# Patient Record
Sex: FEMALE | Race: WHITE | NOT HISPANIC OR LATINO | Employment: UNEMPLOYED | URBAN - METROPOLITAN AREA
[De-identification: names, ages, dates, MRNs, and addresses within clinical notes are randomized per-mention and may not be internally consistent; named-entity substitution may affect disease eponyms.]

---

## 2017-01-04 ENCOUNTER — ANESTHESIA (OUTPATIENT)
Dept: PERIOP | Facility: HOSPITAL | Age: 52
End: 2017-01-04

## 2017-01-04 ENCOUNTER — ANESTHESIA EVENT (OUTPATIENT)
Dept: PERIOP | Facility: HOSPITAL | Age: 52
End: 2017-01-04

## 2017-01-04 PROCEDURE — 25010000002 PROPOFOL 10 MG/ML EMULSION: Performed by: NURSE ANESTHETIST, CERTIFIED REGISTERED

## 2017-01-04 PROCEDURE — 25010000002 ONDANSETRON PER 1 MG: Performed by: NURSE ANESTHETIST, CERTIFIED REGISTERED

## 2017-01-04 PROCEDURE — 25010000002 FENTANYL CITRATE (PF) 100 MCG/2ML SOLUTION: Performed by: NURSE ANESTHETIST, CERTIFIED REGISTERED

## 2017-01-04 PROCEDURE — 25010000003 CEFAZOLIN IN DEXTROSE 2-4 GM/100ML-% SOLUTION: Performed by: SURGERY

## 2017-01-04 PROCEDURE — 25010000002 MIDAZOLAM PER 1 MG: Performed by: NURSE ANESTHETIST, CERTIFIED REGISTERED

## 2017-01-04 RX ORDER — PROPOFOL 10 MG/ML
VIAL (ML) INTRAVENOUS CONTINUOUS PRN
Status: DISCONTINUED | OUTPATIENT
Start: 2017-01-04 | End: 2017-01-04 | Stop reason: SURG

## 2017-01-04 RX ORDER — ONDANSETRON 2 MG/ML
INJECTION INTRAMUSCULAR; INTRAVENOUS AS NEEDED
Status: DISCONTINUED | OUTPATIENT
Start: 2017-01-04 | End: 2017-01-04 | Stop reason: SURG

## 2017-01-04 RX ORDER — MIDAZOLAM HYDROCHLORIDE 1 MG/ML
INJECTION INTRAMUSCULAR; INTRAVENOUS AS NEEDED
Status: DISCONTINUED | OUTPATIENT
Start: 2017-01-04 | End: 2017-01-04 | Stop reason: SURG

## 2017-01-04 RX ORDER — LIDOCAINE HYDROCHLORIDE 10 MG/ML
INJECTION, SOLUTION INFILTRATION; PERINEURAL AS NEEDED
Status: DISCONTINUED | OUTPATIENT
Start: 2017-01-04 | End: 2017-01-04 | Stop reason: SURG

## 2017-01-04 RX ORDER — FENTANYL CITRATE 50 UG/ML
INJECTION, SOLUTION INTRAMUSCULAR; INTRAVENOUS AS NEEDED
Status: DISCONTINUED | OUTPATIENT
Start: 2017-01-04 | End: 2017-01-04 | Stop reason: SURG

## 2017-01-04 RX ADMIN — MIDAZOLAM HYDROCHLORIDE 1 MG: 1 INJECTION, SOLUTION INTRAMUSCULAR; INTRAVENOUS at 10:32

## 2017-01-04 RX ADMIN — ONDANSETRON 4 MG: 2 INJECTION INTRAMUSCULAR; INTRAVENOUS at 10:36

## 2017-01-04 RX ADMIN — FENTANYL CITRATE 50 MCG: 50 INJECTION INTRAMUSCULAR; INTRAVENOUS at 10:25

## 2017-01-04 RX ADMIN — LIDOCAINE HYDROCHLORIDE 60 MG: 10 INJECTION, SOLUTION INFILTRATION; PERINEURAL at 10:25

## 2017-01-04 RX ADMIN — PROPOFOL 100 MCG/KG/MIN: 10 INJECTION, EMULSION INTRAVENOUS at 10:25

## 2017-01-04 RX ADMIN — CEFAZOLIN SODIUM 2 G: 2 INJECTION, SOLUTION INTRAVENOUS at 10:28

## 2017-01-04 RX ADMIN — MIDAZOLAM HYDROCHLORIDE 1 MG: 1 INJECTION, SOLUTION INTRAMUSCULAR; INTRAVENOUS at 10:25

## 2017-01-04 NOTE — ANESTHESIA PREPROCEDURE EVALUATION
Anesthesia Evaluation     Patient summary reviewed    Airway   Mallampati: III  TM distance: >3 FB  possible difficult intubation  Dental      Pulmonary - normal exam   (+) sleep apnea on CPAP,   Cardiovascular - normal exam  (-) dysrhythmias    ECG reviewed    Neuro/Psych  GI/Hepatic/Renal/Endo    (+)  diabetes mellitus,     Musculoskeletal     Abdominal    Substance History      OB/GYN          Other                             Anesthesia Plan    ASA 3     MAC     intravenous induction   Anesthetic plan and risks discussed with patient.

## 2017-01-04 NOTE — ANESTHESIA POSTPROCEDURE EVALUATION
Patient: Shelby Jorgensen    Procedure Summary     Date Anesthesia Start Anesthesia Stop Room / Location    01/04/17 1019 1056  [facility] / [facility]       Procedure Diagnosis Surgeon Provider    EXCISION LEFT UPPER ABDOMINAL WALL LIPOMA (Left ) Lipoma of torso  (Lipoma of torso [D17.1]) MD Rosalie Wolfe MD          Anesthesia Type: MAC  Last vitals  BP      Temp      Pulse     Resp      SpO2 100 % (POST SEDATION) (01/04/17 1017)      Post Anesthesia Care and Evaluation    Patient location during evaluation: bedside  Patient participation: complete - patient participated  Level of consciousness: awake  Pain score: 2  Pain management: adequate  Airway patency: patent  Anesthetic complications: no  Cardiovascular status: acceptable  Respiratory status: acceptable  Hydration status: acceptable

## 2017-02-07 ENCOUNTER — OFFICE VISIT (OUTPATIENT)
Dept: SURGERY | Facility: CLINIC | Age: 52
End: 2017-02-07

## 2017-02-07 DIAGNOSIS — D17.1 LIPOMA OF TORSO: Primary | ICD-10-CM

## 2017-02-07 PROCEDURE — 99024 POSTOP FOLLOW-UP VISIT: CPT | Performed by: SURGERY

## 2017-02-07 NOTE — PROGRESS NOTES
CHIEF COMPLAINT:   Chief Complaint   Patient presents with   • Post-op     PO ·Excision of left upper abdominal wall lipoma (9 cm x 5.5 cm x 2 cm) 1/4/17       HISTORY OF PRESENT ILLNESS:  This is a 51 y.o. female  who presents for a post-operative visit after undergoing a lipoma excision from the left upper abdominal wall on 01/04/2017. She has been doing well and denies any erythema or drainage from the incision. Interestingly, she underwent a laparoscopic gynecologic procedure the day after the lipoma removal and then subsequently caught the flu. So she has struggled to recover well but is not having any issues with respect to the lipoma excision.    Pathology:   LEFT UPPER ABDOMINAL WALL LIPOMA:  LIPOMA WITH FAT NECROSIS.    PHYSICAL EXAM:  Lungs: Clear  Heart: RRR  ABD: Incision is healing well without any erythema or signs of infection.  Ext: no significant edema, calves nontender    A/P:  This is a 51 y.o. female patient who is S/P left upper abdominal wall lipoma excision    Her incision is healing beautifully. Pathology benign. She can follow-up with me as needed.    Kristy Do MD  General and Endoscopic Surgery  [facility], [place], operated by [facility]    [address], [address]  [place], [place], [address]  P: [phone]  F: [phone]

## 2020-12-11 ENCOUNTER — TRANSCRIBE ORDERS (OUTPATIENT)
Dept: ADMINISTRATIVE | Facility: HOSPITAL | Age: 55
End: 2020-12-11

## 2020-12-11 DIAGNOSIS — C73 THYROID CANCER (HCC): Primary | ICD-10-CM

## 2020-12-17 ENCOUNTER — HOSPITAL ENCOUNTER (OUTPATIENT)
Dept: PET IMAGING | Facility: HOSPITAL | Age: 55
Discharge: HOME | End: 2020-12-17

## 2020-12-17 DIAGNOSIS — C73 THYROID CANCER (HCC): ICD-10-CM

## 2020-12-17 LAB — GLUCOSE BLDC GLUCOMTR-MCNC: 110 MG/DL (ref 70–130)

## 2020-12-17 PROCEDURE — 78815 PET IMAGE W/CT SKULL-THIGH: CPT

## 2020-12-17 PROCEDURE — A9552 F18 FDG: HCPCS | Performed by: OTOLARYNGOLOGY

## 2020-12-17 PROCEDURE — 82962 GLUCOSE BLOOD TEST: CPT

## 2020-12-17 PROCEDURE — 0 FLUDEOXYGLUCOSE F18 SOLUTION: Performed by: OTOLARYNGOLOGY

## 2020-12-17 RX ADMIN — FLUDEOXYGLUCOSE F18 1 DOSE: 300 INJECTION INTRAVENOUS at 12:15

## 2020-12-18 ENCOUNTER — APPOINTMENT (OUTPATIENT)
Dept: OTHER | Facility: HOSPITAL | Age: 55
End: 2020-12-18

## 2020-12-18 DIAGNOSIS — Z09 FOLLOW UP: ICD-10-CM

## 2020-12-30 ENCOUNTER — TELEPHONE (OUTPATIENT)
Dept: ONCOLOGY | Facility: CLINIC | Age: 55
End: 2020-12-30

## 2020-12-30 ENCOUNTER — LAB REQUISITION (OUTPATIENT)
Dept: LAB | Facility: HOSPITAL | Age: 55
End: 2020-12-30

## 2020-12-30 DIAGNOSIS — Z00.00 ENCOUNTER FOR GENERAL ADULT MEDICAL EXAMINATION WITHOUT ABNORMAL FINDINGS: ICD-10-CM

## 2020-12-30 NOTE — TELEPHONE ENCOUNTER
Lm for patient to call Yony [phone], I want to verify patient wants to cancel appt 12/31/20 and does she want to reschedule.

## 2020-12-31 ENCOUNTER — APPOINTMENT (OUTPATIENT)
Dept: OTHER | Facility: HOSPITAL | Age: 55
End: 2020-12-31

## 2021-03-26 ENCOUNTER — BULK ORDERING (OUTPATIENT)
Dept: CASE MANAGEMENT | Facility: OTHER | Age: 56
End: 2021-03-26

## 2021-03-26 DIAGNOSIS — Z23 IMMUNIZATION DUE: ICD-10-CM

## 2021-04-20 NOTE — PROGRESS NOTES
"Subjective   Chief Complaint   Patient presents with   • Diabetes   • Hypothyroidism       History of Present Illness   55 y.o. female presents as a new patient to establish care and discuss DM2, HTN, HLD and hypothyroidism.    Diagnosed with thyroid cancer in November 2020. Thyroidectomy January 12th 2021. Undergoing 30 RXT and will finished 4/30/21; tolerating with close follow up with Dr. Wilkerson at the [facility].  ENT with  Dr. Sidhu but scheduling with Dr. Canela. Dr. Pan is her ONC with upcoming scans on 05/06/2021 and will see her 5/12. \"Insulin caused this to happen to me.\"    A-fib medication indued--Invokana stopped with subsequent ablation with Dr. Burgos 5-10 years ago; never recurred.     Diabetes diagnosed 6-7 years ago while seeing Dr. Kehrer. Currently taking Metformin and Jardiance--she is actually unsure what she is taking for her diabetes.  Previously managed by by Demetra Donald at [facility] but would like referral to someone else. H/O neuropathy combo of fall and her diabetes per patient.  Eye exam with Dr. Alvarez.      Denies CP, dyspnea, JORGE, visual changes, hematochezia or melena. No FH CRC. Never had a CLS but had negative Cologard--unsure when.     Reactive airway disease--seasonal Albuterol prn in summer with humidity.     Cymbalta for chronic pain as well as Lyrica for neuropathy and Tramadol with neuro.     P/P and MMG with Dr. Dewey who prescribes her Estrace.     CPAP with Dr. COX--she is not sure who this is--but uses it faithfully.     Taking several dietary supplements at the urging of her daughter. 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    • Nortriptyline Palpitations and Shortness Of Breath   • Prednisone Anaphylaxis     STATES SHORT " OF AIR, PASSED OUT   • Bupivacaine Other (See Comments)     SWELLING AND BLISTERS   • Cortisone Other (See Comments)     Topical blisters.   • Ethyl Chloride      INJECTION, CAUSED LOCAL REDNESS, BURNING   • Latex Swelling     SWELLING (LOCAL REACTION)   • Morphine Hives and Itching   • Oxycodone-Acetaminophen Itching       Current Outpatient Medications on File Prior to Visit   Medication Sig Dispense Refill   • albuterol sulfate HFA (Ventolin HFA) 108 (90 Base) MCG/ACT inhaler Inhale 2 puffs Every 4 (Four) Hours As Needed for Wheezing.     • aspirin 81 MG EC tablet Take 81 mg by mouth Daily.     • baclofen (LIORESAL) 10 MG tablet Take 10 mg by mouth 3 (Three) Times a Day.     • BIOTIN PO Take 1 tablet by mouth Daily. ON HOLD FOR SX.     • Calcium Carb-Cholecalciferol (Calcium/Vitamin D) 500-200 MG-UNIT tablet per tablet Take 1 tablet by mouth 2 (Two) Times a Day.     • DULoxetine (CYMBALTA) 60 MG capsule Take 60 mg by mouth Daily.     • ELDERBERRY PO Take  by mouth.     • Empagliflozin 25 MG tablet Take 25 mg by mouth Daily.     • estradiol (ESTRACE) 2 MG tablet Take 2 mg by mouth Daily.     • fenofibrate 160 MG tablet Take 160 mg by mouth Daily.     • HYDROcodone-acetaminophen (NORCO) 5-325 MG per tablet Take 1 tablet by mouth Every 4 (Four) Hours As Needed. for pain     • losartan (COZAAR) 50 MG tablet Take 50 mg by mouth Daily.     • metFORMIN (GLUCOPHAGE) 500 MG tablet Take 500 mg by mouth 2 (Two) Times a Day With Meals.     • oxyCODONE (ROXICODONE) 5 MG/5ML solution Take 5 mg by mouth Every 4 (Four) Hours As Needed for Moderate Pain . Liquid- every 4-6 hours     • pravastatin (PRAVACHOL) 10 MG tablet Take 10 mg by mouth Daily.     • pregabalin (Lyrica) 200 MG capsule Take 200 mg by mouth 2 (Two) Times a Day.     • traMADol (ULTRAM) 50 MG tablet Take 50 mg by mouth Every 6 (Six) Hours As Needed for Moderate Pain .     • Cholecalciferol (VITAMIN D PO) Take 2,000 mg by mouth Daily.     • sitaGLIPtin-metFORMIN  (JANUMET)  MG per tablet TAKE 1 TABLET BY MOUTH 2 (TWO) TIMES DAILY WITH MEALS     • [DISCONTINUED] busPIRone (BUSPAR) 15 MG tablet Take 15 mg by mouth 2 (Two) Times a Day.     • [DISCONTINUED] Pregabalin (LYRICA PO) Take 200 mg by mouth 2 (Two) Times a Day.     • [DISCONTINUED] tiZANidine (ZANAFLEX) 2 MG tablet TAKE 1 TABLET BY MOUTH EVERY 8 (EIGHT) HOURS AS NEEDED (MUSCLE SPASM)       No current facility-administered medications on file prior to visit.       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   • Degenerative disc disease, lumbar    • Diabetes mellitus (CMS/HCC)     type 2   • Neuropathy    • Sleep apnea     uses CPAP       Family History   Problem Relation Age of Onset   • Diabetes Mother    • Hypertension Father        Social History     Socioeconomic History   • Marital status:      Spouse name: Richard   • Number of children: Not on file   • Years of education: college   • Highest education level: Not on file   Tobacco Use   • Smoking status: Never Smoker   Substance and Sexual Activity   • Alcohol use: Yes     Comment: Occasional   • Drug use: No   • Sexual activity: Defer       Past Surgical History:   Procedure Laterality Date   • CARDIAC ABLATION  2012    Dr. Burgos   • CHOLECYSTECTOMY  1996   • EXCISION MASS TRUNK Left 1/4/2017    Procedure: EXCISION LEFT UPPER ABDOMINAL WALL LIPOMA;  Surgeon: Kristy Do MD;  Location: [facility];  Service:    • HYSTERECTOMY  2004    Dr. Dewey   • STEROID INJECTION N/A 01/14/2016    Multiple Epidural Steroid Injections to the lumbosacral region, [facility]       The following portions of the patient's history were reviewed and updated as appropriate: problem list, allergies, current medications, past medical history, past family history, past social history and past surgical history.    Review of  "Systems    Immunization History   Administered Date(s) Administered   • COVID-19 (MODERNA) 02/08/2021, 03/08/2021   • Influenza, Unspecified 09/08/2020       Objective   Vitals:    04/23/21 1019   BP: 118/78   Pulse: 72   Resp: 12   Temp: 97.1 °F (36.2 °C)   Weight: 94.3 kg (208 lb)   Height: 170.2 cm (67\")     Body mass index is 32.58 kg/m².  Physical Exam  Vitals reviewed.   Constitutional:       Appearance: Normal appearance. She is well-developed. She is obese.   HENT:      Head: Normocephalic and atraumatic.   Neck:      Comments: Anterior neck and upper chest erythematous--currently undergoing radiation treatment for thyroid cancer. No skin breakdown on inspection.   Cardiovascular:      Rate and Rhythm: Normal rate and regular rhythm.      Heart sounds: Normal heart sounds, S1 normal and S2 normal.   Pulmonary:      Effort: Pulmonary effort is normal.      Breath sounds: Normal breath sounds.   Abdominal:      General: Bowel sounds are normal. There is no distension.      Palpations: Abdomen is soft.      Tenderness: There is no abdominal tenderness.   Musculoskeletal:      Comments: Ambulates without assistance; no clubbing, cyanosis or edema.    Skin:     General: Skin is warm and dry.   Neurological:      Mental Status: She is alert.   Psychiatric:         Mood and Affect: Mood normal.         Behavior: Behavior normal.         Procedures    Assessment/Plan   Diagnoses and all orders for this visit:    1. Type 2 diabetes mellitus with diabetic neuropathy, without long-term current use of insulin (CMS/[facility]) (Primary)  Comments:  Last A1C 09/2020 9.7%. She is uncertain of her medications, doses and feels that insulin caused her thyroid cancer. Labs as below and schedule with Dr. Joshi. She is to call when she gets home with ehr medications and doses. Recommendations pending labs. Willing to prescribe Lyrica. Which she is currently taking.   Orders:  -     Ambulatory Referral to Endocrinology  -     " Comprehensive Metabolic Panel  -     Hemoglobin A1c  -     Lipid Panel With / Chol / HDL Ratio  -     Microalbumin / Creatinine Urine Ratio - Urine, Clean Catch    2. Essential hypertension  Comments:  Controlled. Continue Losartan 50 mg daily.     3. Other hyperlipidemia  Comments:  Check FLP with fasting labs next week. Currently on Pravastatin which we will continue at this time.     4. Mixed anxiety depressive disorder  Comments:  Currently on Cymbalta; she would benefit from therapy. Discuss further in follow up.     5. Chronic pain disorder  Comments:  She understands Ultram will need to be prescribed by her neurologist. She is currently on Norco and Roxicodone with the [facility]. Continue Cymbalta. She is currently on Norco and Roxicodone with ONC.     6. Malignant neoplasm of thyroid gland (CMS/HCC)  Comments:  Managed by Dr. Pan at [facility] with Dr. Romo (R-ONC) and Dr. Sidhu ENT. Completes RXT next week.     7. COVID-19 vaccine series completed    PVX and Tdap in follow up. Advised CLS perhaps later this year. Foot exam with neurologist. P/P and MMG with Dr. Dewey. Concerned regarding the number of controlled substances she is on but she is currently undergoing RXT for thyroid cancer.     Records reviewed.     78 minutes spent reviewing records, counseling and coordinating care.     Return in about 1 month (around 5/23/2021) for Lab B4 FUP.

## 2021-04-23 ENCOUNTER — OFFICE VISIT (OUTPATIENT)
Dept: INTERNAL MEDICINE | Facility: CLINIC | Age: 56
End: 2021-04-23

## 2021-04-23 VITALS
DIASTOLIC BLOOD PRESSURE: 78 MMHG | WEIGHT: 208 LBS | HEIGHT: 67 IN | HEART RATE: 72 BPM | TEMPERATURE: 97.1 F | BODY MASS INDEX: 32.65 KG/M2 | RESPIRATION RATE: 12 BRPM | SYSTOLIC BLOOD PRESSURE: 118 MMHG

## 2021-04-23 DIAGNOSIS — C73 MALIGNANT NEOPLASM OF THYROID GLAND (HCC): ICD-10-CM

## 2021-04-23 DIAGNOSIS — G89.4 CHRONIC PAIN DISORDER: Chronic | ICD-10-CM

## 2021-04-23 DIAGNOSIS — F41.8 MIXED ANXIETY DEPRESSIVE DISORDER: Chronic | ICD-10-CM

## 2021-04-23 DIAGNOSIS — E11.40 TYPE 2 DIABETES MELLITUS WITH DIABETIC NEUROPATHY, WITHOUT LONG-TERM CURRENT USE OF INSULIN (HCC): Primary | Chronic | ICD-10-CM

## 2021-04-23 DIAGNOSIS — Z92.29 COVID-19 VACCINE SERIES COMPLETED: ICD-10-CM

## 2021-04-23 DIAGNOSIS — E78.49 OTHER HYPERLIPIDEMIA: Chronic | ICD-10-CM

## 2021-04-23 DIAGNOSIS — I10 ESSENTIAL HYPERTENSION: Chronic | ICD-10-CM

## 2021-04-23 PROBLEM — F51.04 CHRONIC INSOMNIA: Status: ACTIVE | Noted: 2019-01-18

## 2021-04-23 PROBLEM — J30.2 SEASONAL ALLERGIES: Status: ACTIVE | Noted: 2021-04-23

## 2021-04-23 PROCEDURE — 99205 OFFICE O/P NEW HI 60 MIN: CPT | Performed by: NURSE PRACTITIONER

## 2021-04-23 PROCEDURE — 99417 PROLNG OP E/M EACH 15 MIN: CPT | Performed by: NURSE PRACTITIONER

## 2021-04-23 RX ORDER — BACLOFEN 10 MG/1
10 TABLET ORAL 3 TIMES DAILY
COMMUNITY

## 2021-04-23 RX ORDER — PREGABALIN 200 MG/1
200 CAPSULE ORAL 2 TIMES DAILY
COMMUNITY

## 2021-04-23 RX ORDER — PRAVASTATIN SODIUM 10 MG
10 TABLET ORAL DAILY
COMMUNITY
End: 2021-07-26

## 2021-04-23 RX ORDER — HYDROCODONE BITARTRATE AND ACETAMINOPHEN 5; 325 MG/1; MG/1
1 TABLET ORAL EVERY 4 HOURS PRN
COMMUNITY
Start: 2021-01-17 | End: 2021-05-03

## 2021-04-23 RX ORDER — TRAMADOL HYDROCHLORIDE 50 MG/1
50 TABLET ORAL EVERY 6 HOURS PRN
COMMUNITY
End: 2021-06-18 | Stop reason: SDUPTHER

## 2021-04-23 RX ORDER — DULOXETIN HYDROCHLORIDE 60 MG/1
60 CAPSULE, DELAYED RELEASE ORAL DAILY
COMMUNITY

## 2021-04-23 RX ORDER — OXYCODONE HCL 5 MG/5 ML
5 SOLUTION, ORAL ORAL EVERY 4 HOURS PRN
COMMUNITY

## 2021-04-23 RX ORDER — ASPIRIN 81 MG/1
81 TABLET ORAL DAILY
COMMUNITY

## 2021-04-23 RX ORDER — LOSARTAN POTASSIUM 50 MG/1
50 TABLET ORAL DAILY
COMMUNITY
End: 2021-07-26

## 2021-04-23 RX ORDER — FENOFIBRATE 160 MG/1
160 TABLET ORAL DAILY
COMMUNITY

## 2021-04-23 RX ORDER — ALBUTEROL SULFATE 90 UG/1
2 AEROSOL, METERED RESPIRATORY (INHALATION) EVERY 4 HOURS PRN
COMMUNITY

## 2021-04-26 RX ORDER — LIOTHYRONINE SODIUM 25 UG/1
25 TABLET ORAL DAILY
COMMUNITY
End: 2021-05-03

## 2021-04-26 RX ORDER — LEVOTHYROXINE SODIUM 0.15 MG/1
150 TABLET ORAL DAILY
COMMUNITY
End: 2021-05-04 | Stop reason: DRUGHIGH

## 2021-04-26 RX ORDER — MIRTAZAPINE 30 MG/1
30 TABLET, FILM COATED ORAL NIGHTLY
COMMUNITY

## 2021-04-27 LAB
ALBUMIN SERPL-MCNC: 4.4 G/DL (ref 3.5–5.2)
ALBUMIN/GLOB SERPL: 1.9 G/DL
ALP SERPL-CCNC: 71 U/L (ref 39–117)
ALT SERPL-CCNC: 20 U/L (ref 1–33)
AST SERPL-CCNC: 13 U/L (ref 1–32)
BILIRUB SERPL-MCNC: 0.4 MG/DL (ref 0–1.2)
BUN SERPL-MCNC: 14 MG/DL (ref 6–20)
BUN/CREAT SERPL: 13.5 (ref 7–25)
CALCIUM SERPL-MCNC: 10.2 MG/DL (ref 8.6–10.5)
CHLORIDE SERPL-SCNC: 96 MMOL/L (ref 98–107)
CHOLEST SERPL-MCNC: 157 MG/DL (ref 0–200)
CHOLEST/HDLC SERPL: 3.41 {RATIO}
CO2 SERPL-SCNC: 31.6 MMOL/L (ref 22–29)
CREAT SERPL-MCNC: 1.04 MG/DL (ref 0.57–1)
GLOBULIN SER CALC-MCNC: 2.3 GM/DL
GLUCOSE SERPL-MCNC: 302 MG/DL (ref 65–99)
HBA1C MFR BLD: 11.5 % (ref 4.8–5.6)
HDLC SERPL-MCNC: 46 MG/DL (ref 40–60)
LDLC SERPL CALC-MCNC: 71 MG/DL (ref 0–100)
POTASSIUM SERPL-SCNC: 4.6 MMOL/L (ref 3.5–5.2)
PROT SERPL-MCNC: 6.7 G/DL (ref 6–8.5)
SODIUM SERPL-SCNC: 136 MMOL/L (ref 136–145)
TRIGL SERPL-MCNC: 243 MG/DL (ref 0–150)
UNABLE TO VOID: NORMAL
VLDLC SERPL CALC-MCNC: 40 MG/DL (ref 5–40)

## 2021-05-02 NOTE — PATIENT INSTRUCTIONS
Please be sure to schedule your eye exam; if you do not have an eye doctor then I recommend Dr. Malcolm Vilchis or Dr. Valerie Paz.     Please schedule you PAP and pelvic exam with your gynecologist.

## 2021-05-02 NOTE — PROGRESS NOTES
Subjective   Chief Complaint   Patient presents with   • Diabetes     discuss labs        History of Present Illness   55 y.o. female presents for follow up regarding DM2. Completed radiation treatment for thyroid cancer last week! Seeing Dr. Joshi on 7/9. She is down 15# over the last 2 weeks. Her daughter has helped her change to a WFPB diet. She has brought in all of her medications today for review. Currently taking Metformin 1000 mg bid (dose increased 2 weeks ago) and Jardiance 25 mg daily. Checking her BG intermittently. Thyroid cancer diagnosed shortly after starting Basaglar--she thinks this caused her thyroid cancer. Never used Victoza. Januvia in the past. H/O pancreatitis in 2013.     Patient Active Problem List   Diagnosis   • Prothrombin [id number] mutation (CMS/HCC)   • Type 2 diabetes mellitus with diabetic neuropathy, without long-term current use of insulin (CMS/HCC)   • Chronic pain disorder   • Other hyperlipidemia   • Malignant neoplasm of thyroid gland (CMS/HCC)   • Mixed anxiety depressive disorder   • Seasonal allergies   • Chronic insomnia   • Essential hypertension   • Sleep apnea       Allergies   Allergen Reactions   • Codeine Shortness Of Breath, Itching and Other (See Comments)     RINGING EARS.    • Nortriptyline Palpitations and Shortness Of Breath   • Prednisone Anaphylaxis     STATES SHORT OF AIR, PASSED OUT   • Bupivacaine Other (See Comments)     SWELLING AND BLISTERS   • Cortisone Other (See Comments)     Topical blisters.   • Ethyl Chloride      INJECTION, CAUSED LOCAL REDNESS, BURNING   • Latex Swelling     SWELLING (LOCAL REACTION)   • Morphine Hives and Itching   • Oxycodone-Acetaminophen Itching       Current Outpatient Medications on File Prior to Visit   Medication Sig Dispense Refill   • albuterol sulfate HFA (Ventolin HFA) 108 (90 Base) MCG/ACT inhaler Inhale 2 puffs Every 4 (Four) Hours As Needed for Wheezing.     • aspirin 81 MG EC tablet Take 81 mg by mouth Daily.      • baclofen (LIORESAL) 10 MG tablet Take 10 mg by mouth 3 (Three) Times a Day.     • BIOTIN PO Take 1 tablet by mouth Daily. ON HOLD FOR SX.     • calcitriol (ROCALTROL) 0.5 MCG capsule Take 0.5 mcg by mouth Daily.     • Calcium Carb-Cholecalciferol (Calcium/Vitamin D) 500-200 MG-UNIT tablet per tablet Take 1 tablet by mouth 2 (Two) Times a Day.     • Cholecalciferol (VITAMIN D PO) Take 2,000 mg by mouth Daily.     • DULoxetine (CYMBALTA) 60 MG capsule Take 60 mg by mouth Daily.     • Empagliflozin 25 MG tablet Take 25 mg by mouth Daily.     • estradiol (ESTRACE) 2 MG tablet Take 2 mg by mouth Daily.     • fenofibrate 160 MG tablet Take 160 mg by mouth Daily.     • levothyroxine (SYNTHROID, LEVOTHROID) 150 MCG tablet Take 150 mcg by mouth Daily.     • losartan (COZAAR) 50 MG tablet Take 50 mg by mouth Daily.     • mirtazapine (REMERON) 30 MG tablet Take 30 mg by mouth Every Night.     • oxyCODONE (ROXICODONE) 5 MG/5ML solution Take 5 mg by mouth Every 4 (Four) Hours As Needed for Moderate Pain . Liquid- every 4-6 hours     • pravastatin (PRAVACHOL) 10 MG tablet Take 10 mg by mouth Daily.     • pregabalin (Lyrica) 200 MG capsule Take 200 mg by mouth 2 (Two) Times a Day.     • traMADol (ULTRAM) 50 MG tablet Take 50 mg by mouth Every 6 (Six) Hours As Needed for Moderate Pain .     • [DISCONTINUED] metFORMIN (GLUCOPHAGE) 500 MG tablet Take 500 mg by mouth 2 (Two) Times a Day With Meals. Takes 2 tablets bid     • [DISCONTINUED] ELDERBERRY PO Take  by mouth.     • [DISCONTINUED] HYDROcodone-acetaminophen (NORCO) 5-325 MG per tablet Take 1 tablet by mouth Every 4 (Four) Hours As Needed. for pain     • [DISCONTINUED] liothyronine (CYTOMEL) 25 MCG tablet Take 25 mcg by mouth Daily. Takes 1.5 tab daily     • [DISCONTINUED] sitaGLIPtin-metFORMIN (JANUMET)  MG per tablet TAKE 1 TABLET BY MOUTH 2 (TWO) TIMES DAILY WITH MEALS       No current facility-administered medications on file prior to visit.       Past Medical  "History:   Diagnosis Date   • Atrial fibrillation (CMS/HCC)     possibly medically induced   • Degenerative disc disease, lumbar    • Lumbar herniated disc    • Lumbar radiculopathy    • Neuropathy    • Pancreatitis    • Sleep apnea     uses CPAP       Family History   Problem Relation Age of Onset   • Diabetes Mother    • Hypertension Father        Social History     Socioeconomic History   • Marital status:      Spouse name: Richard   • Number of children: Not on file   • Years of education: college   • Highest education level: Not on file   Tobacco Use   • Smoking status: Never Smoker   Substance and Sexual Activity   • Alcohol use: Yes     Comment: Occasional   • Drug use: No   • Sexual activity: Defer       Past Surgical History:   Procedure Laterality Date   • CARDIAC ABLATION  2012    Dr. Burgos   • CHOLECYSTECTOMY  1996   • EXCISION MASS TRUNK Left 1/4/2017    Procedure: EXCISION LEFT UPPER ABDOMINAL WALL LIPOMA;  Surgeon: Kristy Do MD;  Location: [facility];  Service:    • HYSTERECTOMY  2004    Dr. Dewey   • STEROID INJECTION N/A 01/14/2016    Multiple Epidural Steroid Injections to the lumbosacral region, [facility]       The following portions of the patient's history were reviewed and updated as appropriate: problem list, allergies, current medications, past medical history and past social history.    Review of Systems    Immunization History   Administered Date(s) Administered   • COVID-19 (MODERNA) 02/08/2021, 03/08/2021   • Influenza, Unspecified 09/08/2020       Objective   Vitals:    05/03/21 1131   Temp: 97.3 °F (36.3 °C)   Weight: 90.4 kg (199 lb 6.4 oz)   Height: 170.2 cm (67\")     Body mass index is 31.23 kg/m².  Physical Exam  Vitals reviewed.   Constitutional:       Appearance: Normal appearance. She is well-developed.   HENT:      Head: Normocephalic and atraumatic.   Cardiovascular:      Rate and Rhythm: Normal rate and regular rhythm.      Heart sounds: Normal heart sounds, S1 " normal and S2 normal.   Pulmonary:      Effort: Pulmonary effort is normal.      Breath sounds: Normal breath sounds.   Feet:      Right foot:      Protective Sensation: 10 sites tested. 4 sites sensed.      Skin integrity: Skin integrity normal.      Toenail Condition: Right toenails are normal.      Left foot:      Protective Sensation: 10 sites tested. 6 sites sensed.      Skin integrity: Skin integrity normal.      Toenail Condition: Left toenails are normal.   Skin:     General: Skin is warm and dry.      Comments: Skin around neck erythematous and peeling from radiation. Serous fluid seeping at crease where skin is peeling. Does not appear infected.    Neurological:      Mental Status: She is alert.   Psychiatric:         Mood and Affect: Mood normal.         Behavior: Behavior normal.         Procedures    Assessment/Plan      Diagnoses and all orders for this visit:    1. Type 2 diabetes mellitus with diabetic neuropathy, without long-term current use of insulin (CMS/[facility]) (Primary)  Comments:  Uncontrolled. Continue recently increased dose of Metformin 100 mg bid and Jardiance 25 mg daily. Schedule Eye exam with Dr. Alvarez. Scheduled with Dr. Joshi 7/9. Foot exam today--reviewed good foot care. Given change to PB diet will se what she is able to do with her BG and A1C but understands insulin is an option.   Orders:  -     Microalbumin / Creatinine Urine Ratio - Urine, Clean Catch  -     metFORMIN (Glucophage) 1000 MG tablet; Take 1 tablet by mouth 2 (Two) Times a Day With Meals.  Dispense: 180 tablet; Refill: 1    2. Need for 23-polyvalent pneumococcal polysaccharide vaccine  -     Pneumococcal Polysaccharide Vaccine 23-Valent Greater Than or Equal To 3yo Subcutaneous / IM    3. Need for Tdap vaccination  -     Tdap Vaccine Greater Than or Equal To 6yo IM    4. Need for hepatitis C screening test  -     Hepatitis C Antibody    5. Acquired hypothyroidism  Comments:  Labs as below and fax to Dr. Canela.    Orders:  -     TSH  -     T4, free    6. Obesity (BMI 30.0-34.9)  Comments:  She has changed to a WFPB diet and has lost 9# in 2-3 weeks. Continue with dietary changes and start exercising regularly with goal 150 minutes weekly.     Records reviewed include previous OV with myself as well as labs.     Return in about 6 weeks (around 6/14/2021) for Lab Today.

## 2021-05-03 ENCOUNTER — OFFICE VISIT (OUTPATIENT)
Dept: INTERNAL MEDICINE | Facility: CLINIC | Age: 56
End: 2021-05-03

## 2021-05-03 VITALS — BODY MASS INDEX: 31.3 KG/M2 | WEIGHT: 199.4 LBS | HEIGHT: 67 IN | TEMPERATURE: 97.3 F

## 2021-05-03 DIAGNOSIS — E66.9 OBESITY (BMI 30.0-34.9): ICD-10-CM

## 2021-05-03 DIAGNOSIS — Z23 NEED FOR TDAP VACCINATION: ICD-10-CM

## 2021-05-03 DIAGNOSIS — Z11.59 NEED FOR HEPATITIS C SCREENING TEST: ICD-10-CM

## 2021-05-03 DIAGNOSIS — E03.9 ACQUIRED HYPOTHYROIDISM: ICD-10-CM

## 2021-05-03 DIAGNOSIS — E11.40 TYPE 2 DIABETES MELLITUS WITH DIABETIC NEUROPATHY, WITHOUT LONG-TERM CURRENT USE OF INSULIN (HCC): Primary | Chronic | ICD-10-CM

## 2021-05-03 DIAGNOSIS — Z23 NEED FOR 23-POLYVALENT PNEUMOCOCCAL POLYSACCHARIDE VACCINE: ICD-10-CM

## 2021-05-03 PROCEDURE — 90472 IMMUNIZATION ADMIN EACH ADD: CPT | Performed by: NURSE PRACTITIONER

## 2021-05-03 PROCEDURE — 90715 TDAP VACCINE 7 YRS/> IM: CPT | Performed by: NURSE PRACTITIONER

## 2021-05-03 PROCEDURE — 90471 IMMUNIZATION ADMIN: CPT | Performed by: NURSE PRACTITIONER

## 2021-05-03 PROCEDURE — 90732 PPSV23 VACC 2 YRS+ SUBQ/IM: CPT | Performed by: NURSE PRACTITIONER

## 2021-05-03 PROCEDURE — 99214 OFFICE O/P EST MOD 30 MIN: CPT | Performed by: NURSE PRACTITIONER

## 2021-05-03 RX ORDER — CALCITRIOL 0.5 UG/1
0.5 CAPSULE, LIQUID FILLED ORAL DAILY
COMMUNITY
Start: 2021-04-21

## 2021-05-04 DIAGNOSIS — E03.9 ACQUIRED HYPOTHYROIDISM: Primary | ICD-10-CM

## 2021-05-04 LAB
ALBUMIN/CREAT UR: <4 MG/G CREAT (ref 0–29)
CREAT UR-MCNC: 77.4 MG/DL
HCV AB S/CO SERPL IA: <0.1 S/CO RATIO (ref 0–0.9)
MICROALBUMIN UR-MCNC: <3 UG/ML
T4 FREE SERPL-MCNC: 0.92 NG/DL (ref 0.93–1.7)
TSH SERPL DL<=0.005 MIU/L-ACNC: 18.5 UIU/ML (ref 0.27–4.2)

## 2021-05-04 RX ORDER — LEVOTHYROXINE SODIUM 175 UG/1
175 TABLET ORAL DAILY
Qty: 90 TABLET | Refills: 0 | Status: SHIPPED | OUTPATIENT
Start: 2021-05-04

## 2021-05-04 NOTE — PROGRESS NOTES
Pt informed of results and states he was just managing it until she found a new pcp. She would like for you to take over this.

## 2021-05-06 RX ORDER — MIRTAZAPINE 30 MG/1
TABLET, FILM COATED ORAL
Qty: 90 TABLET | Refills: 1 | Status: SHIPPED | OUTPATIENT
Start: 2021-05-06

## 2021-06-09 DIAGNOSIS — E03.9 ACQUIRED HYPOTHYROIDISM: Primary | ICD-10-CM

## 2021-06-13 NOTE — PROGRESS NOTES
"Subjective   Chief Complaint   Patient presents with   • Pain     Lymphedema in neck after thryroidectomy due to malignancy; also peripheral neuropathy--legs   • Diabetes       History of Present Illness   55 y.o. female presents for follow up regarding chronic pain. Advised last week by Dr. Sidhu ENT at [facility] she has lymphedema in her neck and face following thyroidectomy after diagnosis of malignancy. She will start working with the lymphedema clinic next week; there has been a \"garment\" ordered to help this and she is hopeful.Advised it will take 1-2 years before she returns to baseline. 30 X-RT treatments completed. Slaivary glands were burned and are not functioning. Will see Dr. Sidhu back in 4 months.     PT this morning for her shoulders and neck. Wondering about a muscle relaxer but she is already on Baclofen for this. She is on Cymbalta for both this and depression. Also has Oxycodone solution from ENT following surgery and radiation; trying to stop  Oxycodone solution and has not had any for 3 days.  Prescribed Lyrica 200 mg bid for diabetic neuropathy and previously prescribed Tramadol from last PCP--Dr. Callahan. She has enough to get her through the week. Prescribed up to 60 a month in the past. Lyrica makes a huge difference and only uses Tramadol for breakthrough pain. She had worked with pain management previously and is wondering whether they could help her without some of the medication she is currently on and did not realize Tramadol is an opiate.       She has lost 11# since her last visit and 20# overall since changing to a WFPB diet. Scheduled with Dr. Joshi 7/7/21.        Patient Active Problem List   Diagnosis   • Prothrombin [id number] mutation (CMS/HCC)   • Type 2 diabetes mellitus with diabetic neuropathy, without long-term current use of insulin (CMS/HCC)   • Chronic pain disorder   • Other hyperlipidemia   • Malignant neoplasm of thyroid gland (CMS/HCC)   • Mixed anxiety depressive " disorder   • Seasonal allergies   • Chronic insomnia   • Essential hypertension   • Sleep apnea   • Acquired lymphedema       Allergies   Allergen Reactions   • Codeine Shortness Of Breath, Itching and Other (See Comments)     RINGING EARS.    • Nortriptyline Palpitations and Shortness Of Breath   • Prednisone Anaphylaxis     STATES SHORT OF AIR, PASSED OUT   • Bupivacaine Other (See Comments)     SWELLING AND BLISTERS   • Cortisone Other (See Comments)     Topical blisters.   • Ethyl Chloride      INJECTION, CAUSED LOCAL REDNESS, BURNING   • Latex Swelling     SWELLING (LOCAL REACTION)   • Morphine Hives and Itching   • Oxycodone-Acetaminophen Itching       Current Outpatient Medications on File Prior to Visit   Medication Sig Dispense Refill   • albuterol sulfate HFA (Ventolin HFA) 108 (90 Base) MCG/ACT inhaler Inhale 2 puffs Every 4 (Four) Hours As Needed for Wheezing.     • aspirin 81 MG EC tablet Take 81 mg by mouth Daily.     • baclofen (LIORESAL) 10 MG tablet Take 10 mg by mouth 3 (Three) Times a Day.     • BIOTIN PO Take 1 tablet by mouth Daily. ON HOLD FOR SX.     • calcitriol (ROCALTROL) 0.5 MCG capsule Take 0.5 mcg by mouth Daily.     • Calcium Carb-Cholecalciferol (Calcium/Vitamin D) 500-200 MG-UNIT tablet per tablet Take 1 tablet by mouth 2 (Two) Times a Day.     • Cholecalciferol (VITAMIN D PO) Take 2,000 mg by mouth Daily.     • DULoxetine (CYMBALTA) 60 MG capsule Take 60 mg by mouth Daily.     • estradiol (ESTRACE) 2 MG tablet Take 2 mg by mouth Daily.     • fenofibrate 160 MG tablet Take 160 mg by mouth Daily.     • levothyroxine (SYNTHROID, LEVOTHROID) 175 MCG tablet Take 1 tablet by mouth Daily. 90 tablet 0   • losartan (COZAAR) 50 MG tablet Take 50 mg by mouth Daily.     • metFORMIN (Glucophage) 1000 MG tablet Take 1 tablet by mouth 2 (Two) Times a Day With Meals. 180 tablet 1   • mirtazapine (REMERON SOL-TAB) 30 MG disintegrating tablet TAKE 1 TABLET BY MOUTH EVERY NIGHT AT BEDTIME 90 tablet 1    • mirtazapine (REMERON) 30 MG tablet Take 30 mg by mouth Every Night.     • oxyCODONE (ROXICODONE) 5 MG/5ML solution Take 5 mg by mouth Every 4 (Four) Hours As Needed for Moderate Pain . Liquid- every 4-6 hours     • pravastatin (PRAVACHOL) 10 MG tablet Take 10 mg by mouth Daily.     • pregabalin (Lyrica) 200 MG capsule Take 200 mg by mouth 2 (Two) Times a Day.     • traMADol (ULTRAM) 50 MG tablet Take 50 mg by mouth Every 6 (Six) Hours As Needed for Moderate Pain .     • Unable to find 1 each 1 (One) Time. healios powder one scoop at night     • [DISCONTINUED] Empagliflozin 25 MG tablet Take 25 mg by mouth Daily.       No current facility-administered medications on file prior to visit.       Past Medical History:   Diagnosis Date   • Atrial fibrillation (CMS/HCC)     possibly medically induced   • Degenerative disc disease, lumbar    • Lumbar herniated disc    • Lumbar radiculopathy    • Neuropathy    • Pancreatitis    • Sleep apnea     uses CPAP       Family History   Problem Relation Age of Onset   • Diabetes Mother    • Hypertension Father        Social History     Socioeconomic History   • Marital status:      Spouse name: Richard   • Number of children: Not on file   • Years of education: college   • Highest education level: Not on file   Tobacco Use   • Smoking status: Never Smoker   • Smokeless tobacco: Never Used   Substance and Sexual Activity   • Alcohol use: Yes     Comment: Occasional   • Drug use: No   • Sexual activity: Defer       Past Surgical History:   Procedure Laterality Date   • CARDIAC ABLATION  2012    Dr. Burgos   • CHOLECYSTECTOMY  1996   • EXCISION MASS TRUNK Left 1/4/2017    Procedure: EXCISION LEFT UPPER ABDOMINAL WALL LIPOMA;  Surgeon: Kristy Do MD;  Location: [facility] OR;  Service:    • HYSTERECTOMY  2004    Dr. Dewey   • STEROID INJECTION N/A 01/14/2016    Multiple Epidural Steroid Injections to the lumbosacral region, [facility]       The following portions of the  "patient's history were reviewed and updated as appropriate: problem list, allergies, current medications, past medical history and past social history.    Review of Systems    Immunization History   Administered Date(s) Administered   • COVID-19 (MODERNA) 02/08/2021, 03/08/2021   • Influenza, Unspecified 09/08/2020   • Pneumococcal Polysaccharide (PPSV23) 05/03/2021   • Tdap 05/03/2021       Objective   Vitals:    06/14/21 1429   BP: 134/82   Pulse: 92   Resp: 20   Temp: 97.5 °F (36.4 °C)   Weight: 85.3 kg (188 lb)   Height: 170.2 cm (67\")     Body mass index is 29.44 kg/m².  Physical Exam  Vitals reviewed.   Constitutional:       Appearance: Normal appearance. She is well-developed.   HENT:      Head: Normocephalic and atraumatic.   Neck:      Comments: Erythema about neck and upper chest has resolved. Skin is softening.  Cardiovascular:      Rate and Rhythm: Normal rate and regular rhythm.      Heart sounds: Normal heart sounds, S1 normal and S2 normal.   Pulmonary:      Effort: Pulmonary effort is normal.      Breath sounds: Normal breath sounds.   Skin:     General: Skin is warm and dry.   Neurological:      Mental Status: She is alert.   Psychiatric:         Mood and Affect: Mood normal.         Behavior: Behavior normal.         Procedures    Assessment/Plan   Diagnoses and all orders for this visit:    1. Chronic pain disorder (Primary)  Comments:  Discussed concerns regarding multiple pain medications. Diabetic neuropathy well managed with Lyrica. Understands my concern regarding Tramadol and Oxycodone solution. Understands I am unwilling to  refill Tramadol after this month if Oxycodone solution use continues. She is requesting referral to pain management to see if there are other options for her other than current regime.   Orders:  -     Ambulatory Referral to Pain Management    2. High risk medication use  Comments:  ESCOBAR reviewed and appropriate. UDS today and drug contract signed. Provided UDS " appropriate will refill Tramadol. Unwilling to prescribe Tramadol if Oxycodone  solution use continues next month. She and her  voice understanding.   Orders:  -     Compliance Drug Analysis, Ur - Urine, Clean Catch  -     Ambulatory Referral to Pain Management    3. Malignant neoplasm of thyroid gland (CMS/[facility])  Comments:  Followed by Dr. Sidhu and will be seen at lymphedema clinic next week. Weaning off Oxycodone soultion--no use for 3 days.     4. Type 2 diabetes mellitus with diabetic neuropathy, without long-term current use of insulin (CMS/[facility])  Comments:  She is down 20# over the last couple months with dietary changes. Refill Jardiance today and will see Dr. Joshi in July with repeat A1C at that time.   Orders:  -     Empagliflozin 25 MG tablet; Take 25 mg by mouth Daily.  Dispense: 30 tablet; Refill: 1    5. Acquired lymphedema  Comments:  Face and neck following thyroidectomy for thyroid malignancy. Starts with Lymphedema Clinic next week.     Records reviewed include previous OV with myself as well as labs.     Return in about 3 months (around 9/14/2021) for Annual physical.

## 2021-06-14 ENCOUNTER — OFFICE VISIT (OUTPATIENT)
Dept: INTERNAL MEDICINE | Facility: CLINIC | Age: 56
End: 2021-06-14

## 2021-06-14 VITALS
WEIGHT: 188 LBS | SYSTOLIC BLOOD PRESSURE: 134 MMHG | TEMPERATURE: 97.5 F | HEIGHT: 67 IN | DIASTOLIC BLOOD PRESSURE: 82 MMHG | RESPIRATION RATE: 20 BRPM | HEART RATE: 92 BPM | BODY MASS INDEX: 29.51 KG/M2

## 2021-06-14 DIAGNOSIS — Z79.899 HIGH RISK MEDICATION USE: ICD-10-CM

## 2021-06-14 DIAGNOSIS — I89.0 ACQUIRED LYMPHEDEMA: ICD-10-CM

## 2021-06-14 DIAGNOSIS — C73 MALIGNANT NEOPLASM OF THYROID GLAND (HCC): ICD-10-CM

## 2021-06-14 DIAGNOSIS — G89.4 CHRONIC PAIN DISORDER: Primary | ICD-10-CM

## 2021-06-14 DIAGNOSIS — E11.40 TYPE 2 DIABETES MELLITUS WITH DIABETIC NEUROPATHY, WITHOUT LONG-TERM CURRENT USE OF INSULIN (HCC): Chronic | ICD-10-CM

## 2021-06-14 PROCEDURE — 99214 OFFICE O/P EST MOD 30 MIN: CPT | Performed by: NURSE PRACTITIONER

## 2021-06-18 ENCOUNTER — TELEPHONE (OUTPATIENT)
Dept: INTERNAL MEDICINE | Facility: CLINIC | Age: 56
End: 2021-06-18

## 2021-06-18 DIAGNOSIS — G89.4 CHRONIC PAIN DISORDER: Primary | ICD-10-CM

## 2021-06-18 RX ORDER — TRAMADOL HYDROCHLORIDE 50 MG/1
50 TABLET ORAL EVERY 6 HOURS PRN
Qty: 30 TABLET | Refills: 0 | Status: SHIPPED | OUTPATIENT
Start: 2021-06-18

## 2021-06-18 NOTE — TELEPHONE ENCOUNTER
Her uds is not back yet. I have sent in 30 pills only. She understands that if her UDS is anything other than negative I cannot by law fill her Tramadol. An order was placed for pain management. We discussed this at her visit earlier this week.

## 2021-06-18 NOTE — TELEPHONE ENCOUNTER
Pt called again- she states that she cannot just go off this medication. I explained to her that the drug screen is not back yet. Please advise

## 2021-06-18 NOTE — TELEPHONE ENCOUNTER
DELETE AFTER REVIEWING: Telephone encounter to be sent to the  pool     Caller: Caryn Jorgensen    Relationship: Self    Best call back number: [phone]   What is the best time to reach you:   Who are you requesting to speak with (clinical staff, provider,  specific staff member): CLINICAL    Do you know the name of the person who called: CARYN    What was the call regarding: SHE IS NEEDING A CALL BACK WITH LAB RESULTS , SHE NEEDS HER MEDS THIS WEEKEND.     Do you require a callback: YES

## 2021-06-19 LAB — DRUGS UR: NORMAL

## 2021-06-30 ENCOUNTER — TELEPHONE (OUTPATIENT)
Dept: INTERNAL MEDICINE | Facility: CLINIC | Age: 56
End: 2021-06-30

## 2021-07-11 ENCOUNTER — APPOINTMENT (OUTPATIENT)
Dept: CT IMAGING | Facility: HOSPITAL | Age: 56
End: 2021-07-11

## 2021-07-11 ENCOUNTER — HOSPITAL ENCOUNTER (EMERGENCY)
Facility: HOSPITAL | Age: 56
Discharge: ANOTHER HOSPITAL | End: 2021-07-11
Attending: EMERGENCY MEDICINE | Admitting: EMERGENCY MEDICINE

## 2021-07-11 VITALS
HEIGHT: 67 IN | TEMPERATURE: 97.6 F | HEART RATE: 91 BPM | SYSTOLIC BLOOD PRESSURE: 141 MMHG | DIASTOLIC BLOOD PRESSURE: 81 MMHG | RESPIRATION RATE: 18 BRPM | BODY MASS INDEX: 28.56 KG/M2 | WEIGHT: 182 LBS | OXYGEN SATURATION: 100 %

## 2021-07-11 DIAGNOSIS — R22.0 SWELLING OF FACE: ICD-10-CM

## 2021-07-11 DIAGNOSIS — R22.1 NECK SWELLING: Primary | ICD-10-CM

## 2021-07-11 DIAGNOSIS — R22.2 CHEST SWELLING: ICD-10-CM

## 2021-07-11 DIAGNOSIS — R13.10 DYSPHAGIA, UNSPECIFIED TYPE: ICD-10-CM

## 2021-07-11 LAB
ALBUMIN SERPL-MCNC: 4 G/DL (ref 3.5–5.2)
ALBUMIN/GLOB SERPL: 1.6 G/DL
ALP SERPL-CCNC: 53 U/L (ref 39–117)
ALT SERPL W P-5'-P-CCNC: 16 U/L (ref 1–33)
ANION GAP SERPL CALCULATED.3IONS-SCNC: 13.2 MMOL/L (ref 5–15)
AST SERPL-CCNC: 14 U/L (ref 1–32)
BASOPHILS # BLD AUTO: 0.02 10*3/MM3 (ref 0–0.2)
BASOPHILS NFR BLD AUTO: 0.3 % (ref 0–1.5)
BILIRUB SERPL-MCNC: 0.3 MG/DL (ref 0–1.2)
BUN SERPL-MCNC: 16 MG/DL (ref 6–20)
BUN/CREAT SERPL: 21.6 (ref 7–25)
CALCIUM SPEC-SCNC: 8.5 MG/DL (ref 8.6–10.5)
CHLORIDE SERPL-SCNC: 104 MMOL/L (ref 98–107)
CO2 SERPL-SCNC: 23.8 MMOL/L (ref 22–29)
CREAT SERPL-MCNC: 0.74 MG/DL (ref 0.57–1)
DEPRECATED RDW RBC AUTO: 43.2 FL (ref 37–54)
EOSINOPHIL # BLD AUTO: 0.29 10*3/MM3 (ref 0–0.4)
EOSINOPHIL NFR BLD AUTO: 3.9 % (ref 0.3–6.2)
ERYTHROCYTE [DISTWIDTH] IN BLOOD BY AUTOMATED COUNT: 13.7 % (ref 12.3–15.4)
GFR SERPL CREATININE-BSD FRML MDRD: 81 ML/MIN/1.73
GLOBULIN UR ELPH-MCNC: 2.5 GM/DL
GLUCOSE SERPL-MCNC: 112 MG/DL (ref 65–99)
HCT VFR BLD AUTO: 43.2 % (ref 34–46.6)
HGB BLD-MCNC: 14 G/DL (ref 12–15.9)
IMM GRANULOCYTES # BLD AUTO: 0.02 10*3/MM3 (ref 0–0.05)
IMM GRANULOCYTES NFR BLD AUTO: 0.3 % (ref 0–0.5)
LYMPHOCYTES # BLD AUTO: 0.5 10*3/MM3 (ref 0.7–3.1)
LYMPHOCYTES NFR BLD AUTO: 6.8 % (ref 19.6–45.3)
MCH RBC QN AUTO: 28.2 PG (ref 26.6–33)
MCHC RBC AUTO-ENTMCNC: 32.4 G/DL (ref 31.5–35.7)
MCV RBC AUTO: 87.1 FL (ref 79–97)
MONOCYTES # BLD AUTO: 0.55 10*3/MM3 (ref 0.1–0.9)
MONOCYTES NFR BLD AUTO: 7.5 % (ref 5–12)
NEUTROPHILS NFR BLD AUTO: 5.99 10*3/MM3 (ref 1.7–7)
NEUTROPHILS NFR BLD AUTO: 81.2 % (ref 42.7–76)
NRBC BLD AUTO-RTO: 0 /100 WBC (ref 0–0.2)
PLATELET # BLD AUTO: 329 10*3/MM3 (ref 140–450)
PMV BLD AUTO: 10.4 FL (ref 6–12)
POTASSIUM SERPL-SCNC: 4 MMOL/L (ref 3.5–5.2)
PROT SERPL-MCNC: 6.5 G/DL (ref 6–8.5)
RBC # BLD AUTO: 4.96 10*6/MM3 (ref 3.77–5.28)
SARS-COV-2 RNA RESP QL NAA+PROBE: NOT DETECTED
SODIUM SERPL-SCNC: 141 MMOL/L (ref 136–145)
WBC # BLD AUTO: 7.37 10*3/MM3 (ref 3.4–10.8)

## 2021-07-11 PROCEDURE — 99284 EMERGENCY DEPT VISIT MOD MDM: CPT

## 2021-07-11 PROCEDURE — 80053 COMPREHEN METABOLIC PANEL: CPT | Performed by: PHYSICIAN ASSISTANT

## 2021-07-11 PROCEDURE — 25010000002 IOPAMIDOL 61 % SOLUTION: Performed by: EMERGENCY MEDICINE

## 2021-07-11 PROCEDURE — 25010000002 HYDROMORPHONE 1 MG/ML SOLUTION: Performed by: EMERGENCY MEDICINE

## 2021-07-11 PROCEDURE — 96374 THER/PROPH/DIAG INJ IV PUSH: CPT

## 2021-07-11 PROCEDURE — 85025 COMPLETE CBC W/AUTO DIFF WBC: CPT | Performed by: PHYSICIAN ASSISTANT

## 2021-07-11 PROCEDURE — 96376 TX/PRO/DX INJ SAME DRUG ADON: CPT

## 2021-07-11 PROCEDURE — U0003 INFECTIOUS AGENT DETECTION BY NUCLEIC ACID (DNA OR RNA); SEVERE ACUTE RESPIRATORY SYNDROME CORONAVIRUS 2 (SARS-COV-2) (CORONAVIRUS DISEASE [COVID-19]), AMPLIFIED PROBE TECHNIQUE, MAKING USE OF HIGH THROUGHPUT TECHNOLOGIES AS DESCRIBED BY CMS-2020-01-R: HCPCS | Performed by: PHYSICIAN ASSISTANT

## 2021-07-11 PROCEDURE — 70491 CT SOFT TISSUE NECK W/DYE: CPT

## 2021-07-11 PROCEDURE — 25010000002 HYDROMORPHONE PER 4 MG: Performed by: EMERGENCY MEDICINE

## 2021-07-11 RX ORDER — DOXYCYCLINE 50 MG/1
100 CAPSULE ORAL 2 TIMES DAILY
COMMUNITY

## 2021-07-11 RX ORDER — HYDROMORPHONE HYDROCHLORIDE 1 MG/ML
0.5 INJECTION, SOLUTION INTRAMUSCULAR; INTRAVENOUS; SUBCUTANEOUS ONCE
Status: COMPLETED | OUTPATIENT
Start: 2021-07-11 | End: 2021-07-11

## 2021-07-11 RX ADMIN — HYDROMORPHONE HYDROCHLORIDE 1 MG: 1 INJECTION, SOLUTION INTRAMUSCULAR; INTRAVENOUS; SUBCUTANEOUS at 16:32

## 2021-07-11 RX ADMIN — IOPAMIDOL 75 ML: 612 INJECTION, SOLUTION INTRAVENOUS at 13:22

## 2021-07-11 RX ADMIN — HYDROMORPHONE HYDROCHLORIDE 1 MG: 1 INJECTION, SOLUTION INTRAMUSCULAR; INTRAVENOUS; SUBCUTANEOUS at 13:10

## 2021-07-11 RX ADMIN — HYDROMORPHONE HYDROCHLORIDE 0.5 MG: 1 INJECTION, SOLUTION INTRAMUSCULAR; INTRAVENOUS; SUBCUTANEOUS at 14:14

## 2021-07-11 NOTE — ED PROVIDER NOTES
I have supervised the care provided by the midlevel provider.    We have discussed this patient's history, physical exam, and treatment plan.   I have reviewed the note and have personally examined the patient and agree with the plan of care.  See attached attending note.  My personal findings are below:    Patient complains of intermittent neck swelling for the past few months.  She had thyroid surgery and lymph node removal in January of this year at [facility].  States she has had lymphedema in her neck off and on since then.  Swelling has worsened over the past week or so.  She is having difficulty swallowing.  Denies shortness of breath, fever, or chest pain.    On exam: Awake and alert.  Swallowing secretions without difficulty.  There is soft tissue swelling over the entire anterior neck.  No crepitus.  Heart is regular rate and rhythm.  Lungs are clear bilaterally.    Plan is to obtain labs and CT scan.     Artie Jacob MD  07/11/21 5566

## 2021-07-11 NOTE — ED PROVIDER NOTES
EMERGENCY DEPARTMENT ENCOUNTER    Room Number:  02/02  Date seen:  7/11/2021  Time seen: 10:46 EDT  PCP: Kari Sandoval APRN  Historian: patient      HPI:  Chief Complaint: facial swelling    A complete HPI/ROS/PMH/PSH/SH/FH are unobtainable due to: none    Context: Shelby Jorgensen is a 55 y.o. female who presents to the ED for evaluation of facial swelling.  Patient states she has had a mild amount of waxing and waning facial swelling after she underwent thyroidectomy and lymph node dissection for thyroid neoplasm in January with subsequent radiation which ended April 30.  She states that she was diagnosed with lymphedema in the area and it had been reasonably well controlled with lymphedema massage until the last week where it started gradually worsening and over the last 2 days has really escalated.  She states she has some difficulty swallowing at her baseline but feels it is worse, also feels that when she lays back that she has trouble breathing and starts choking.  Her voice is hoarse which waxes and wanes since her surgery but seems to worsen when her swelling increases and is exacerbated currently.  She denies any fevers chills chest pain shortness of breath vomiting fever or other complaints at this time.  There is no swelling elsewhere.    PAST MEDICAL HISTORY  Active Ambulatory Problems     Diagnosis Date Noted   • Prothrombin [id number] mutation (CMS/[facility]) 12/22/2016   • Type 2 diabetes mellitus with diabetic neuropathy, without long-term current use of insulin (CMS/[facility]) 04/23/2021   • Chronic pain disorder 01/18/2019   • Other hyperlipidemia 04/23/2021   • Malignant neoplasm of thyroid gland (CMS/[facility]) 04/23/2021   • Mixed anxiety depressive disorder 04/23/2021   • Seasonal allergies 04/23/2021   • Chronic insomnia 01/18/2019   • Essential hypertension 04/23/2021   • Sleep apnea 05/20/2015   • Acquired lymphedema 06/14/2021     Resolved Ambulatory Problems     Diagnosis Date Noted   • No Resolved  Ambulatory Problems     Past Medical History:   Diagnosis Date   • Atrial fibrillation (CMS/HCC)    • Degenerative disc disease, lumbar    • Lumbar herniated disc    • Lumbar radiculopathy    • Neuropathy    • Pancreatitis          PAST SURGICAL HISTORY  Past Surgical History:   Procedure Laterality Date   • CARDIAC ABLATION  2012    Dr. Burgos   • CHOLECYSTECTOMY  1996   • EXCISION MASS TRUNK Left 1/4/2017    Procedure: EXCISION LEFT UPPER ABDOMINAL WALL LIPOMA;  Surgeon: Kristy Do MD;  Location: [facility];  Service:    • HYSTERECTOMY  2004    Dr. Dewey   • STEROID INJECTION N/A 01/14/2016    Multiple Epidural Steroid Injections to the lumbosacral region, [facility]         FAMILY HISTORY  Family History   Problem Relation Age of Onset   • Diabetes Mother    • Hypertension Father          SOCIAL HISTORY  Social History     Socioeconomic History   • Marital status:      Spouse name: Richard   • Number of children: Not on file   • Years of education: college   • Highest education level: Not on file   Tobacco Use   • Smoking status: Never Smoker   • Smokeless tobacco: Never Used   Substance and Sexual Activity   • Alcohol use: Yes     Comment: Occasional   • Drug use: No   • Sexual activity: Defer         ALLERGIES  Codeine, Nortriptyline, Prednisone, Bupivacaine, Cortisone, Ethyl chloride, Latex, Morphine, and Oxycodone-acetaminophen        REVIEW OF SYSTEMS  Review of Systems     All systems reviewed and negative except for those discussed in HPI.       PHYSICAL EXAM  ED Triage Vitals   Temp Heart Rate Resp BP SpO2   07/11/21 1019 07/11/21 1019 07/11/21 1021 07/11/21 1029 07/11/21 1019   97.6 °F (36.4 °C) 101 18 116/89 95 %      Temp src Heart Rate Source Patient Position BP Location FiO2 (%)   -- 07/11/21 1029 07/11/21 1029 07/11/21 1029 --    Monitor Lying Right arm          GENERAL: not distressed  HENT: atraumatic.  Diffuse swelling to the cheeks, submental area and upper anterior chest wall.   There is no erythema or crepitus.  Voice is hoarse but she is speaking freely, tolerating her own secretions without difficulty.  Posterior oropharynx difficult to visualize due to difficulty opening her mouth since her surgery however visualized area appears patent, uvula midline  EYES: no scleral icterus, PERRL  CV: regular rhythm, regular rate  RESPIRATORY: normal effort CTA B  ABDOMEN: soft, nontender  MUSCULOSKELETAL: no deformity  NEURO: alert, moves all extremities, follows commands  SKIN: warm, dry    Vital signs and nursing notes reviewed.          LAB RESULTS  Recent Results (from the past 24 hour(s))   Comprehensive Metabolic Panel    Collection Time: 07/11/21 11:19 AM    Specimen: Blood   Result Value Ref Range    Glucose 112 (H) 65 - 99 mg/dL    BUN 16 6 - 20 mg/dL    Creatinine 0.74 0.57 - 1.00 mg/dL    Sodium 141 136 - 145 mmol/L    Potassium 4.0 3.5 - 5.2 mmol/L    Chloride 104 98 - 107 mmol/L    CO2 23.8 22.0 - 29.0 mmol/L    Calcium 8.5 (L) 8.6 - 10.5 mg/dL    Total Protein 6.5 6.0 - 8.5 g/dL    Albumin 4.00 3.50 - 5.20 g/dL    ALT (SGPT) 16 1 - 33 U/L    AST (SGOT) 14 1 - 32 U/L    Alkaline Phosphatase 53 39 - 117 U/L    Total Bilirubin 0.3 0.0 - 1.2 mg/dL    eGFR Non African Amer 81 >60 mL/min/1.73    Globulin 2.5 gm/dL    A/G Ratio 1.6 g/dL    BUN/Creatinine Ratio 21.6 7.0 - 25.0    Anion Gap 13.2 5.0 - 15.0 mmol/L   CBC Auto Differential    Collection Time: 07/11/21 11:19 AM    Specimen: Blood   Result Value Ref Range    WBC 7.37 3.40 - 10.80 10*3/mm3    RBC 4.96 3.77 - 5.28 10*6/mm3    Hemoglobin 14.0 12.0 - 15.9 g/dL    Hematocrit 43.2 34.0 - 46.6 %    MCV 87.1 79.0 - 97.0 fL    MCH 28.2 26.6 - 33.0 pg    MCHC 32.4 31.5 - 35.7 g/dL    RDW 13.7 12.3 - 15.4 %    RDW-SD 43.2 37.0 - 54.0 fl    MPV 10.4 6.0 - 12.0 fL    Platelets 329 140 - 450 10*3/mm3    Neutrophil % 81.2 (H) 42.7 - 76.0 %    Lymphocyte % 6.8 (L) 19.6 - 45.3 %    Monocyte % 7.5 5.0 - 12.0 %    Eosinophil % 3.9 0.3 - 6.2 %     Basophil % 0.3 0.0 - 1.5 %    Immature Grans % 0.3 0.0 - 0.5 %    Neutrophils, Absolute 5.99 1.70 - 7.00 10*3/mm3    Lymphocytes, Absolute 0.50 (L) 0.70 - 3.10 10*3/mm3    Monocytes, Absolute 0.55 0.10 - 0.90 10*3/mm3    Eosinophils, Absolute 0.29 0.00 - 0.40 10*3/mm3    Basophils, Absolute 0.02 0.00 - 0.20 10*3/mm3    Immature Grans, Absolute 0.02 0.00 - 0.05 10*3/mm3    nRBC 0.0 0.0 - 0.2 /100 WBC       Ordered the above labs and independently reviewed the results.        RADIOLOGY  CT Soft Tissue Neck With Contrast    Result Date: 2021  Narrative: CT SCAN OF THE SOFT TISSUE NECK WITH INTRAVENOUS CONTRAST  HISTORY: Has had thyroidectomy followed by radiation for thyroid cancer and finished approximately 3 months ago. Has had some difficulty swallowing and right-sided pain.  TECHNIQUE/FINDINGS: The CT scan was performed as an emergency procedure through the soft tissue neck with intravenous contrast and demonstrates the followin. There is diffuse edematous change in the subcutaneous soft tissues and throughout the muscle planes of the neck likely related to postradiation change. This is new since the preoperative PET fusion CT scan dated 2020. The large thyroid mass has been removed since that time. 2. The hypopharynx in the supraglottic region appears quite small and may relate to generalized edematous change as seen on images 38 through 41. This is located below the level of the epiglottis and further evaluation with direct laryngoscopy may be helpful. 3. There is no enhancing mass or fluid collection. Allowing for the induration of the fat planes, no discrete adenopathy is seen. The visualized sinuses and mastoid air cells are clear. 4. There is normal enhancement of the vascular structures. The lung apices are clear.      Radiation dose reduction techniques were utilized, including automated exposure control and exposure modulation based on body size.  This report was finalized on 2021 2:08  PM by Dr. Jose Romero M.D.        I ordered the above noted radiological studies. Reviewed by me and discussed with radiologist.  See dictation for official radiology interpretation.    PROCEDURES  Procedures        MEDICATIONS GIVEN IN ER  Medications   iopamidol (ISOVUE-300) 61 % injection 100 mL (75 mL Intravenous Given by Other 7/11/21 1322)   HYDROmorphone (DILAUDID) injection 1 mg (1 mg Intravenous Given 7/11/21 1310)   HYDROmorphone (DILAUDID) injection 0.5 mg (0.5 mg Intravenous Given 7/11/21 1414)             PROGRESS AND CONSULTS    DDX includes but not limited to airway narrowing, airway occlusion, esophageal narrowing, esophageal, occlusion, cellulitis, lymphedema exacerbation    ED Course as of Jul 11 1502   Sun Jul 11, 2021   1102 Medical chart reviewed.  [facility]21 showed extensive subcutaneous edema to the lower face soft tissues neck and torso predominantly in the anterior upper chest and subcutaneous fat.  Significant edema throughout the neck musculature and subcutaneous fat as well.  No definite thrombus in the jugular veins, superior vena cava is patent and opacified with contrast.  Lungs were clear.  No acute pathology in the upper abdomen.    [KA]   1148 WBC: 7.37 [KA]   1148 Hemoglobin: 14.0 [KA]   1302 I reassessed the patient.  She was taken to CT however could not tolerate lying down due to pain and sensation of choking.  I discussed with the patient as well as Dr. Jacob.  We will give her a dose of Dilaudid, she is on Percocet at home and has not had it since this morning.  Place her on some oxygen and retry her CT scan.  I have counseled her on the importance of this test to further evaluate what is going on and she verbalizes understanding.    [KA]   1354 I discussed the patient with Dr. Romero, radiologist.  He states that the patient's CT neck with contrast shows disc fuse generalized edema throughout the subcutaneous tissue of the lower  face and neck, no focal abnormality.  Airway narrowing Shane back to the level of the larynx, difficult to assess for acute issue in the region.   Prior thyroidectomy and changes consistent with previous radiation.    [KA]   1423 Discussed the patient with Dr. Chris Noyola, ENT on-call for Dr. Sidhu at [facility].  He recommends transferring the patient to [facility] in an ED to ED transfer for further evaluation and treatment.    I have called the film library who will make a CD of her images to send with her.    I have discussed my conversation with Dr. Noyola with the patient who is agreeable with the transfer.  She is stable, pain improved, vitals normal on the monitor.    [KA]   1443 I discussed the patient with Dr. Bills, ER physician of [facility].  We discussed the patient's history presentation and imaging and he agrees to accept the patient for transfer.    [KA]      ED Course User Index  [KA] Shoshana Colon PA        Reviewed pt's history and workup with Dr. Jacob.  After a bedside evaluation; they agree with the plan of care      Patient was placed in face mask in first look. Patient was wearing facemask each time I entered the room and throughout our encounter. I wore protective equipment throughout this patient encounter including a face mask, eye shield and gloves. Hand hygiene was performed before donning protective equipment and after removal when leaving the room.        DIAGNOSIS  Final diagnoses:   Neck swelling   Swelling of face   Chest swelling   Dysphagia, unspecified type           Latest Documented Vital Signs:  As of 15:02 EDT  BP- 122/78 HR- 85 Temp- 97.6 °F (36.4 °C) O2 sat- 96%       Shoshana Colon PA  07/11/21 1430       Shoshana Colon PA  07/11/21 1502

## 2021-07-11 NOTE — ED NOTES
This RN wearing all appropriate PPE during patient encounter. Hand hygiene performed before and during entering room.       Monisha Herring, HUGH  07/11/21 5115

## 2021-07-11 NOTE — ED NOTES
This RN wearing all appropriate PPE during patient encounter. Hand hygiene performed before and during entering room.       Monisha Herring, HUGH  07/11/21 8148

## 2021-07-11 NOTE — ED NOTES
This RN wearing all appropriate PPE during patient encounter. Hand hygiene performed before and during entering room.       Mnoisha Herring, HUGH  07/11/21 6078

## 2021-07-11 NOTE — ED NOTES
This RN wearing all appropriate PPE during patient encounter. Hand hygiene performed before and during entering room.       Monisha Herring, HUGH  07/11/21 8527

## 2021-07-11 NOTE — ED NOTES
This RN wearing all appropriate PPE during patient encounter. Hand hygiene performed before and during entering room.       Monisha Herring, HUGH  07/11/21 5533

## 2021-07-11 NOTE — ED TRIAGE NOTES
Pt reports neck and facial swelling started this week and states now hard to swallow. Pt has had lymph nodes and thyroid removed in January due to cancer.

## 2021-07-11 NOTE — ED NOTES
This RN wearing all appropriate PPE during patient encounter. Hand hygiene performed before and during entering room.       Monisha Herring, HUGH  07/11/21 0839

## 2021-07-26 DIAGNOSIS — E78.49 OTHER HYPERLIPIDEMIA: Chronic | ICD-10-CM

## 2021-07-26 DIAGNOSIS — I10 ESSENTIAL HYPERTENSION: Chronic | ICD-10-CM

## 2021-07-26 RX ORDER — ESTRADIOL 2 MG/1
TABLET ORAL
Qty: 90 TABLET | Refills: 1 | Status: SHIPPED | OUTPATIENT
Start: 2021-07-26

## 2021-07-26 RX ORDER — DEXAMETHASONE 2 MG/1
TABLET ORAL
Qty: 30 TABLET | Refills: 0 | OUTPATIENT
Start: 2021-07-26

## 2021-07-26 RX ORDER — LOSARTAN POTASSIUM 50 MG/1
TABLET ORAL
Qty: 90 TABLET | Refills: 1 | Status: SHIPPED | OUTPATIENT
Start: 2021-07-26

## 2021-07-26 RX ORDER — PRAVASTATIN SODIUM 10 MG
TABLET ORAL
Qty: 90 TABLET | Refills: 3 | Status: SHIPPED | OUTPATIENT
Start: 2021-07-26